# Patient Record
Sex: FEMALE | Race: BLACK OR AFRICAN AMERICAN | NOT HISPANIC OR LATINO
[De-identification: names, ages, dates, MRNs, and addresses within clinical notes are randomized per-mention and may not be internally consistent; named-entity substitution may affect disease eponyms.]

---

## 2021-07-13 ENCOUNTER — TRANSCRIPTION ENCOUNTER (OUTPATIENT)
Age: 46
End: 2021-07-13

## 2021-07-13 ENCOUNTER — APPOINTMENT (OUTPATIENT)
Dept: ORTHOPEDIC SURGERY | Facility: CLINIC | Age: 46
End: 2021-07-13
Payer: COMMERCIAL

## 2021-07-13 ENCOUNTER — NON-APPOINTMENT (OUTPATIENT)
Age: 46
End: 2021-07-13

## 2021-07-13 VITALS
HEART RATE: 85 BPM | SYSTOLIC BLOOD PRESSURE: 124 MMHG | BODY MASS INDEX: 24.22 KG/M2 | WEIGHT: 135 LBS | HEIGHT: 62.5 IN | DIASTOLIC BLOOD PRESSURE: 81 MMHG

## 2021-07-13 DIAGNOSIS — Z00.00 ENCOUNTER FOR GENERAL ADULT MEDICAL EXAMINATION W/OUT ABNORMAL FINDINGS: ICD-10-CM

## 2021-07-13 PROCEDURE — 99203 OFFICE O/P NEW LOW 30 MIN: CPT

## 2021-07-13 NOTE — PHYSICAL EXAM
[de-identified] : General Exam\par \par Well developed, well nourished\par No apparent distress\par Oriented to person, place, and time\par Mood: Normal\par Affect: Normal\par Balance and coordination: Normal\par Gait: Normal\par \par Left shoulder exam\par \par Inspection: No swelling, ecchymosis or gross deformity.\par Skin: No masses, No lesions\par Tenderness: No bicipital tenderness, no tenderness to the greater tuberosity/RTC insertion, no anterior shoulder/lesser tuberosity tenderness. No tenderness SC joint, clavicle, AC joint.\par ROM: 150/60/T12\par Impingement tests: Positive Meraz\par AC Joint: no pain with cross arm testing\par Biceps: Negative speed\par Strength: 4/5 abduction, 5/5 external rotation, and internal rotation\par Neuro: AIN, PIN, Ulnar nerve motor intact\par Sensation: Intact to light touch in radial, median, ulnar, and axillary nerve distributions\par Vasc: 2+ radial pulse\par beighton score 5/9\par  [de-identified] : radiographs of the left shoulder obtained reviewed today the glenohumeral joint is well maintained. There is no fracture\par \par MRI of the left shoulder obtained in 2013 with a nondisplaced SLAP tear

## 2021-07-13 NOTE — HISTORY OF PRESENT ILLNESS
[de-identified] : 46yo female presents complaining of left shoulder pain since January. She has a history of shoulder dislocations last time over 7 years ago. She states she may have subluxed her shoulder in January while cooking. Overall symptoms worsening. She saw another physician with [facility] on 04/20/2021. She underwent x-rays at that time show no acute abnormalities. Physical therapy was recommended. She is here today for further followup and evaluation. She has pain anterior lateral aspect worse with overhead activity. She reports stiffness reaching behind her back. Denies numbness tingling\par \par The patient's past medical history, past surgical history, medications, allergies, and social history were reviewed by me today with the patient and documented accordingly. In addition, the patient's family history, which is noncontributory to this visit, was also reviewed.\par

## 2021-07-13 NOTE — DISCUSSION/SUMMARY
[de-identified] : 45-year-old female long-standing shoulder pain. She has hyperlaxity on examination the lower shoulder stiff today. She has pain in multiple joints possibly related to underlying hyperlaxity. I recommend MR arthrogram at this time to evaluate for patulous capsule of her shoulder. She will follow up with imaging to further discuss treatment options. All questions were answered

## 2021-07-28 ENCOUNTER — RESULT REVIEW (OUTPATIENT)
Age: 46
End: 2021-07-28

## 2021-07-28 ENCOUNTER — NON-APPOINTMENT (OUTPATIENT)
Age: 46
End: 2021-07-28

## 2021-07-28 ENCOUNTER — OUTPATIENT (OUTPATIENT)
Dept: OUTPATIENT SERVICES | Facility: HOSPITAL | Age: 46
LOS: 1 days | End: 2021-07-28
Payer: COMMERCIAL

## 2021-07-28 ENCOUNTER — APPOINTMENT (OUTPATIENT)
Dept: RADIOLOGY | Facility: CLINIC | Age: 46
End: 2021-07-28
Payer: COMMERCIAL

## 2021-07-28 ENCOUNTER — APPOINTMENT (OUTPATIENT)
Dept: MRI IMAGING | Facility: CLINIC | Age: 46
End: 2021-07-28
Payer: COMMERCIAL

## 2021-07-28 DIAGNOSIS — M25.512 PAIN IN LEFT SHOULDER: ICD-10-CM

## 2021-07-28 PROCEDURE — 73222 MRI JOINT UPR EXTREM W/DYE: CPT | Mod: 26,LT

## 2021-07-28 PROCEDURE — 23350 INJECTION FOR SHOULDER X-RAY: CPT

## 2021-07-28 PROCEDURE — 73222 MRI JOINT UPR EXTREM W/DYE: CPT

## 2021-07-28 PROCEDURE — 77002 NEEDLE LOCALIZATION BY XRAY: CPT | Mod: 26

## 2021-07-28 PROCEDURE — 77002 NEEDLE LOCALIZATION BY XRAY: CPT

## 2021-08-13 ENCOUNTER — APPOINTMENT (OUTPATIENT)
Dept: ORTHOPEDIC SURGERY | Facility: CLINIC | Age: 46
End: 2021-08-13
Payer: COMMERCIAL

## 2021-08-13 VITALS — WEIGHT: 135 LBS | HEIGHT: 62 IN | BODY MASS INDEX: 24.84 KG/M2

## 2021-08-13 PROCEDURE — 99213 OFFICE O/P EST LOW 20 MIN: CPT

## 2021-08-13 NOTE — DISCUSSION/SUMMARY
[de-identified] : 45-year-old female left shoulder labral tear and tear of the inferior glenohumeral ligament. She has recurrent instability. We discussed treatment options at length both surgical and nonsurgical. We discussed nonsurgical treatment with activity modification physical therapy. We discussed surgical treatment. Left shoulder arthroscopy labral repair, capsulorrhaphy, possible arthroscopic versus open capsular repair. Risks benefits alternatives discussed including but not limited to risks such as bleeding infection nerve and vessel injury continued pain stiffness need for future surgery medical complications such as DVT or PE and anesthesia complications. All questions were answered she will schedule at her convenience

## 2021-08-13 NOTE — HISTORY OF PRESENT ILLNESS
[de-identified] : 46yo female presents complaining of left shoulder pain since January. She has a history of shoulder dislocations last time over 7 years ago. She states she may have subluxed her shoulder in January while cooking. Overall symptoms worsening. She saw another physician with [facility] on 04/20/2021. She underwent x-rays at that time show no acute abnormalities. Physical therapy was recommended. She is here today for further followup and evaluation. She has pain anterior lateral aspect worse with overhead activity. She reports stiffness reaching behind her back. Denies numbness tingling\par A MRI was done since last visit, here to review results today.\par

## 2021-08-13 NOTE — PHYSICAL EXAM
[de-identified] : Left shoulder exam\par \par Inspection: No swelling, ecchymosis or gross deformity.\par Skin: No masses, No lesions\par Tenderness: No bicipital tenderness, no tenderness to the greater tuberosity/RTC insertion, no anterior shoulder/lesser tuberosity tenderness. No tenderness SC joint, clavicle, AC joint.\par ROM: 150/60/T12\par Impingement tests: Positive Meraz\par AC Joint: no pain with cross arm testing\par Biceps: Negative speed\par Strength: 4/5 abduction, 5/5 external rotation, and internal rotation\par Neuro: AIN, PIN, Ulnar nerve motor intact\par Sensation: Intact to light touch in radial, median, ulnar, and axillary nerve distributions\par Vasc: 2+ radial pulse\par beighton score 5/9 [de-identified] : MRI with contrast left shoulder is deficient posterior and inferior labrum. There is a tear of the inferior glenohumeral ligament.No rotator cuff tear.

## 2021-10-04 ENCOUNTER — OUTPATIENT (OUTPATIENT)
Dept: OUTPATIENT SERVICES | Facility: HOSPITAL | Age: 46
LOS: 1 days | Discharge: HOME | End: 2021-10-04

## 2021-10-04 VITALS
WEIGHT: 139.99 LBS | TEMPERATURE: 98 F | HEART RATE: 78 BPM | SYSTOLIC BLOOD PRESSURE: 116 MMHG | DIASTOLIC BLOOD PRESSURE: 78 MMHG | RESPIRATION RATE: 17 BRPM | HEIGHT: 62.5 IN | OXYGEN SATURATION: 96 %

## 2021-10-04 DIAGNOSIS — Z01.818 ENCOUNTER FOR OTHER PREPROCEDURAL EXAMINATION: ICD-10-CM

## 2021-10-04 DIAGNOSIS — M25.512 PAIN IN LEFT SHOULDER: ICD-10-CM

## 2021-10-04 LAB
ANION GAP SERPL CALC-SCNC: 7 MMOL/L — SIGNIFICANT CHANGE UP (ref 5–17)
BASOPHILS # BLD AUTO: 0.03 K/UL — SIGNIFICANT CHANGE UP (ref 0–0.2)
BASOPHILS NFR BLD AUTO: 0.5 % — SIGNIFICANT CHANGE UP (ref 0–2)
BUN SERPL-MCNC: 10 MG/DL — SIGNIFICANT CHANGE UP (ref 7–23)
CALCIUM SERPL-MCNC: 9.3 MG/DL — SIGNIFICANT CHANGE UP (ref 8.5–10.1)
CHLORIDE SERPL-SCNC: 108 MMOL/L — SIGNIFICANT CHANGE UP (ref 96–108)
CO2 SERPL-SCNC: 23 MMOL/L — SIGNIFICANT CHANGE UP (ref 22–31)
CREAT SERPL-MCNC: 0.92 MG/DL — SIGNIFICANT CHANGE UP (ref 0.5–1.3)
EOSINOPHIL # BLD AUTO: 0.11 K/UL — SIGNIFICANT CHANGE UP (ref 0–0.5)
EOSINOPHIL NFR BLD AUTO: 1.7 % — SIGNIFICANT CHANGE UP (ref 0–6)
GLUCOSE SERPL-MCNC: 75 MG/DL — SIGNIFICANT CHANGE UP (ref 70–99)
HCG SERPL-ACNC: <1 MIU/ML — SIGNIFICANT CHANGE UP
HCT VFR BLD CALC: 46 % — HIGH (ref 34.5–45)
HGB BLD-MCNC: 15.2 G/DL — SIGNIFICANT CHANGE UP (ref 11.5–15.5)
IMM GRANULOCYTES NFR BLD AUTO: 0.3 % — SIGNIFICANT CHANGE UP (ref 0–1.5)
LYMPHOCYTES # BLD AUTO: 1.52 K/UL — SIGNIFICANT CHANGE UP (ref 1–3.3)
LYMPHOCYTES # BLD AUTO: 23.9 % — SIGNIFICANT CHANGE UP (ref 13–44)
MCHC RBC-ENTMCNC: 30.6 PG — SIGNIFICANT CHANGE UP (ref 27–34)
MCHC RBC-ENTMCNC: 33 GM/DL — SIGNIFICANT CHANGE UP (ref 32–36)
MCV RBC AUTO: 92.7 FL — SIGNIFICANT CHANGE UP (ref 80–100)
MONOCYTES # BLD AUTO: 0.5 K/UL — SIGNIFICANT CHANGE UP (ref 0–0.9)
MONOCYTES NFR BLD AUTO: 7.8 % — SIGNIFICANT CHANGE UP (ref 2–14)
NEUTROPHILS # BLD AUTO: 4.19 K/UL — SIGNIFICANT CHANGE UP (ref 1.8–7.4)
NEUTROPHILS NFR BLD AUTO: 65.8 % — SIGNIFICANT CHANGE UP (ref 43–77)
NRBC # BLD: 0 /100 WBCS — SIGNIFICANT CHANGE UP (ref 0–0)
PLATELET # BLD AUTO: 268 K/UL — SIGNIFICANT CHANGE UP (ref 150–400)
POTASSIUM SERPL-MCNC: 4.2 MMOL/L — SIGNIFICANT CHANGE UP (ref 3.5–5.3)
POTASSIUM SERPL-SCNC: 4.2 MMOL/L — SIGNIFICANT CHANGE UP (ref 3.5–5.3)
RBC # BLD: 4.96 M/UL — SIGNIFICANT CHANGE UP (ref 3.8–5.2)
RBC # FLD: 14 % — SIGNIFICANT CHANGE UP (ref 10.3–14.5)
SODIUM SERPL-SCNC: 138 MMOL/L — SIGNIFICANT CHANGE UP (ref 135–145)
WBC # BLD: 6.37 K/UL — SIGNIFICANT CHANGE UP (ref 3.8–10.5)
WBC # FLD AUTO: 6.37 K/UL — SIGNIFICANT CHANGE UP (ref 3.8–10.5)

## 2021-10-04 NOTE — H&P PST ADULT - HISTORY OF PRESENT ILLNESS
46 yo female c/o left shoulder pain secondary to labral tear - scheduled for left shoulder arthroscopy   denies recent travels in the past 30 days. No fever, SOB, cough, flu like symptoms or body rash- covid screen  number 1 covid19 vaccine received

## 2021-10-04 NOTE — H&P PST ADULT - NSANTHOSAYNRD_GEN_A_CORE
No. ANUM screening performed.  STOP BANG Legend: 0-2 = LOW Risk; 3-4 = INTERMEDIATE Risk; 5-8 = HIGH Risk

## 2021-10-04 NOTE — H&P PST ADULT - PROBLEM SELECTOR PLAN 2
Preop instructions provided including NPO status. Hibiclens wash for infection control. Patient aware to stop NSAID, OTC herbals  for 7-10 days, needs to be accompanied  by adult upon discharge.  Patient verbalized understanding  patient instructed on having covid19 swab 3 days prior to surgery

## 2021-10-06 DIAGNOSIS — Z01.818 ENCOUNTER FOR OTHER PREPROCEDURAL EXAMINATION: ICD-10-CM

## 2021-10-08 ENCOUNTER — APPOINTMENT (OUTPATIENT)
Dept: DISASTER EMERGENCY | Facility: CLINIC | Age: 46
End: 2021-10-08

## 2021-10-08 LAB — SARS-COV-2 N GENE NPH QL NAA+PROBE: NOT DETECTED

## 2021-10-10 ENCOUNTER — TRANSCRIPTION ENCOUNTER (OUTPATIENT)
Age: 46
End: 2021-10-10

## 2021-10-11 ENCOUNTER — APPOINTMENT (OUTPATIENT)
Dept: ORTHOPEDIC SURGERY | Facility: HOSPITAL | Age: 46
End: 2021-10-11

## 2021-10-11 ENCOUNTER — OUTPATIENT (OUTPATIENT)
Dept: OUTPATIENT SERVICES | Facility: HOSPITAL | Age: 46
LOS: 1 days | Discharge: HOME | End: 2021-10-11
Payer: COMMERCIAL

## 2021-10-11 VITALS
HEART RATE: 82 BPM | WEIGHT: 138.01 LBS | RESPIRATION RATE: 18 BRPM | SYSTOLIC BLOOD PRESSURE: 116 MMHG | HEIGHT: 62 IN | DIASTOLIC BLOOD PRESSURE: 74 MMHG | TEMPERATURE: 98 F | OXYGEN SATURATION: 99 %

## 2021-10-11 VITALS
DIASTOLIC BLOOD PRESSURE: 66 MMHG | SYSTOLIC BLOOD PRESSURE: 109 MMHG | HEART RATE: 81 BPM | OXYGEN SATURATION: 97 % | RESPIRATION RATE: 16 BRPM

## 2021-10-11 LAB — HCG UR QL: NEGATIVE — SIGNIFICANT CHANGE UP

## 2021-10-11 PROCEDURE — 29806 SHO ARTHRS SRG CAPSULORRAPHY: CPT | Mod: LT

## 2021-10-11 RX ORDER — ONDANSETRON 8 MG/1
1 TABLET, FILM COATED ORAL
Qty: 28 | Refills: 0
Start: 2021-10-11 | End: 2021-10-17

## 2021-10-11 RX ORDER — SODIUM CHLORIDE 9 MG/ML
3 INJECTION INTRAMUSCULAR; INTRAVENOUS; SUBCUTANEOUS EVERY 8 HOURS
Refills: 0 | Status: DISCONTINUED | OUTPATIENT
Start: 2021-10-11 | End: 2021-10-11

## 2021-10-11 RX ORDER — ONDANSETRON 8 MG/1
1 TABLET, FILM COATED ORAL
Qty: 10 | Refills: 0
Start: 2021-10-11

## 2021-10-11 RX ORDER — SODIUM CHLORIDE 9 MG/ML
1000 INJECTION, SOLUTION INTRAVENOUS
Refills: 0 | Status: DISCONTINUED | OUTPATIENT
Start: 2021-10-11 | End: 2021-10-11

## 2021-10-11 RX ORDER — HYDROMORPHONE HYDROCHLORIDE 2 MG/ML
0.5 INJECTION INTRAMUSCULAR; INTRAVENOUS; SUBCUTANEOUS
Refills: 0 | Status: DISCONTINUED | OUTPATIENT
Start: 2021-10-11 | End: 2021-10-11

## 2021-10-11 RX ORDER — OXYCODONE 5 MG/1
5 TABLET ORAL
Qty: 30 | Refills: 0 | Status: ACTIVE | COMMUNITY
Start: 2021-10-11 | End: 1900-01-01

## 2021-10-11 RX ORDER — OXYCODONE HYDROCHLORIDE 5 MG/1
1 TABLET ORAL
Qty: 30 | Refills: 0
Start: 2021-10-11 | End: 2021-10-15

## 2021-10-11 RX ORDER — DOCUSATE SODIUM 100 MG
1 CAPSULE ORAL
Qty: 21 | Refills: 0
Start: 2021-10-11 | End: 2021-10-17

## 2021-10-11 RX ORDER — ONDANSETRON 8 MG/1
4 TABLET, FILM COATED ORAL ONCE
Refills: 0 | Status: DISCONTINUED | OUTPATIENT
Start: 2021-10-11 | End: 2021-10-11

## 2021-10-11 RX ORDER — OXYCODONE HYDROCHLORIDE 5 MG/1
1 TABLET ORAL
Qty: 30 | Refills: 0
Start: 2021-10-11

## 2021-10-11 RX ORDER — DOCUSATE SODIUM 100 MG
1 CAPSULE ORAL
Qty: 30 | Refills: 0
Start: 2021-10-11 | End: 2021-10-20

## 2021-10-11 RX ADMIN — SODIUM CHLORIDE 50 MILLILITER(S): 9 INJECTION, SOLUTION INTRAVENOUS at 14:51

## 2021-10-11 RX ADMIN — SODIUM CHLORIDE 3 MILLILITER(S): 9 INJECTION INTRAMUSCULAR; INTRAVENOUS; SUBCUTANEOUS at 10:11

## 2021-10-11 RX ADMIN — HYDROMORPHONE HYDROCHLORIDE 0.5 MILLIGRAM(S): 2 INJECTION INTRAMUSCULAR; INTRAVENOUS; SUBCUTANEOUS at 15:30

## 2021-10-11 RX ADMIN — HYDROMORPHONE HYDROCHLORIDE 0.5 MILLIGRAM(S): 2 INJECTION INTRAMUSCULAR; INTRAVENOUS; SUBCUTANEOUS at 15:45

## 2021-10-11 NOTE — ASU PREOP CHECKLIST - ASSESSMENT, HISTORY & PHYSICAL COMPLETED AND ON MEDICAL RECORD
Will start with Physical Therapy but low threshold for sending for MRI if not improving.  MRI already ordered.    Continue your substance recovery.     done

## 2021-10-11 NOTE — ASU DISCHARGE PLAN (ADULT/PEDIATRIC) - CARE PROVIDER_API CALL
Jamal Zarate)  Orthopaedic Surgery  [address], [address]  [place] [address]  Phone: [phone]  Fax: [phone]  Follow Up Time:

## 2021-10-11 NOTE — BRIEF OPERATIVE NOTE - NSICDXBRIEFPREOP_GEN_ALL_CORE_FT
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

## 2021-10-18 DIAGNOSIS — M25.312 OTHER INSTABILITY, LEFT SHOULDER: ICD-10-CM
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
## 2021-10-18 DIAGNOSIS — S43.402A UNSPECIFIED SPRAIN OF LEFT SHOULDER JOINT, INITIAL ENCOUNTER: ICD-10-CM
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
## 2021-10-22 ENCOUNTER — APPOINTMENT (OUTPATIENT)
Dept: ORTHOPEDIC SURGERY | Facility: CLINIC | Age: 46
End: 2021-10-22
Payer: COMMERCIAL

## 2021-10-22 VITALS — WEIGHT: 135 LBS | HEIGHT: 62 IN | BODY MASS INDEX: 24.84 KG/M2

## 2021-10-22 PROBLEM — Z78.9 OTHER SPECIFIED HEALTH STATUS: Chronic | Status: ACTIVE | Noted: 2021-10-04

## 2021-10-22 PROCEDURE — 99024 POSTOP FOLLOW-UP VISIT: CPT

## 2021-10-22 NOTE — HISTORY OF PRESENT ILLNESS
[de-identified] : L shoulder [de-identified] : 44yo F s/p Left shoulder arthroscopy, Bankart labral repair, and capsulorrhaphy, 10/11/21.  She is doing well. not yet started physical therapy. Pain control. Denies numbness tingling [de-identified] : left shoulder exam.\par inc healed well. no erythema\par no pain gentle passive rom\par able to flex/ext all fingers\par silt r/u/m/ax\par bcr\par  [de-identified] : 44yo F s/p Left shoulder arthroscopy, Bankart labral repair, and capsulorrhaphy, 10/11/21. [de-identified] : We reviewed postoperative protocol and restrictions we reviewed intraoperative photographs encouraged to start physical therapy continue sling immobilization she will follow up in 1 month.  All questions were answered

## 2021-11-24 ENCOUNTER — APPOINTMENT (OUTPATIENT)
Dept: ORTHOPEDIC SURGERY | Facility: CLINIC | Age: 46
End: 2021-11-24
Payer: COMMERCIAL

## 2021-11-24 PROCEDURE — 99024 POSTOP FOLLOW-UP VISIT: CPT

## 2021-11-24 NOTE — HISTORY OF PRESENT ILLNESS
[de-identified] : L shoulder [de-identified] : 46yo F s/p Left shoulder arthroscopy, Bankart labral repair, and capsulorrhaphy, 10/11/21.  She is doing well. working with physical therapy. Pain control. Denies numbness tingling [de-identified] : left shoulder exam.\par inc healed well. no erythema\par no pain gentle passive rom\par able to flex/ext all fingers\par silt r/u/m/ax\par bcr\par  [de-identified] : 44yo F s/p Left shoulder arthroscopy, Bankart labral repair, and capsulorrhaphy, 10/11/21. [de-identified] : She is overall doing well.  Her pain is controlled.  Her shoulder is stable on examination.  She may discontinue her sling at this time.  Continue physical therapy.  She will follow up in 6 weeks.  All questions answered

## 2022-01-05 ENCOUNTER — APPOINTMENT (OUTPATIENT)
Dept: ORTHOPEDIC SURGERY | Facility: CLINIC | Age: 47
End: 2022-01-05
Payer: COMMERCIAL

## 2022-01-05 PROCEDURE — 99024 POSTOP FOLLOW-UP VISIT: CPT

## 2022-01-05 NOTE — HISTORY OF PRESENT ILLNESS
[de-identified] : L shoulder [de-identified] : 44yo F s/p Left shoulder arthroscopy, Bankart labral repair, and capsulorrhaphy, 10/11/21.  She is doing well. working with physical therapy. Pain control. Denies numbness tingling [de-identified] : left shoulder exam.\par inc healed well. no erythema\par no pain gentle passive rom\par Active forward elevation 1 to 20 degrees external rotation 30 degrees\par able to flex/ext all fingers\par silt r/u/m/ax\par bcr\par  [de-identified] : 46yo F s/p Left shoulder arthroscopy, Bankart labral repair, and capsulorrhaphy, 10/11/21. [de-identified] : She does still have some stiffness.  She has switched physical therapy prescription, she was not getting appropriate care and she is noting some improvement.  To continue with her physical therapy focusing on range of motion and strengthening.  Shoulder is stable on examination today.  She will follow up in 2 months.  All questions were answered

## 2022-03-09 ENCOUNTER — APPOINTMENT (OUTPATIENT)
Dept: ORTHOPEDIC SURGERY | Facility: CLINIC | Age: 47
End: 2022-03-09
Payer: COMMERCIAL

## 2022-03-09 VITALS — BODY MASS INDEX: 24.84 KG/M2 | WEIGHT: 135 LBS | HEIGHT: 62 IN

## 2022-03-09 DIAGNOSIS — M25.512 PAIN IN LEFT SHOULDER: ICD-10-CM

## 2022-03-09 DIAGNOSIS — G89.29 PAIN IN LEFT SHOULDER: ICD-10-CM

## 2022-03-09 PROCEDURE — 99213 OFFICE O/P EST LOW 20 MIN: CPT

## 2022-03-09 NOTE — DISCUSSION/SUMMARY
[de-identified] : She is overall doing well at this time.  She is cleared return to activities as tolerated.  She may follow-up as needed.  All questions answered

## 2022-03-09 NOTE — PHYSICAL EXAM
[de-identified] : Left shoulder exam\par \par Inspection: No swelling, ecchymosis or gross deformity.\par Skin: No masses, No lesions\par Tenderness: No bicipital tenderness, no tenderness to the greater tuberosity/RTC insertion, no anterior shoulder/lesser tuberosity tenderness. No tenderness SC joint, clavicle, AC joint.\par ROM: 160/60/T6\par Instability: 1+ anterior load-and-shift negative apprehension\par Impingement tests: Positive Meraz\par AC Joint: no pain with cross arm testing\par Biceps: Negative speed\par Strength: 5/5 abduction, external rotation, and internal rotation\par Neuro: AIN, PIN, Ulnar nerve motor intact\par Sensation: Intact to light touch in radial, median, ulnar, and axillary nerve distributions\par Vasc: 2+ radial pulse\par

## 2022-03-09 NOTE — HISTORY OF PRESENT ILLNESS
[de-identified] : 47yo F s/p Left shoulder arthroscopy, Bankart labral repair, and capsulorrhaphy, 10/11/21.  She is overall doing well.  Her pain is controlled.  She is working with physical therapy.  She denies numbness and tingling
